# Patient Record
Sex: MALE | URBAN - METROPOLITAN AREA
[De-identification: names, ages, dates, MRNs, and addresses within clinical notes are randomized per-mention and may not be internally consistent; named-entity substitution may affect disease eponyms.]

---

## 2022-05-12 ENCOUNTER — OFFICE VISIT (OUTPATIENT)
Dept: URBAN - METROPOLITAN AREA CLINIC 6 | Facility: CLINIC | Age: 42
End: 2022-05-12
Payer: COMMERCIAL

## 2022-05-12 DIAGNOSIS — H11.051 PERIPHERAL PTERYGIUM, PROGRESSIVE, RIGHT EYE: Primary | ICD-10-CM

## 2022-05-12 DIAGNOSIS — H04.123 TEAR FILM INSUFFICIENCY OF BILATERAL LACRIMAL GLANDS: ICD-10-CM

## 2022-05-12 PROCEDURE — 99204 OFFICE O/P NEW MOD 45 MIN: CPT | Performed by: OPTOMETRIST

## 2022-05-12 ASSESSMENT — INTRAOCULAR PRESSURE
OS: 23
OD: 20

## 2022-05-12 NOTE — IMPRESSION/PLAN
Impression: Tear film insufficiency of bilateral lacrimal glands: H04.123. Plan: Dry eyes - SPK centrally appears to be causing variable blurry vision. Recommend use of high quality artificial tears 3-4x per day prn.

## 2022-05-12 NOTE — IMPRESSION/PLAN
Impression: Peripheral pterygium, progressive, right eye: H11.051. Plan: Pterygium right eye, causing astigmatism/refractive error/  and vision changes: patient reports significant irritation refractory to medical treatment and that it is growing in size. The risks, benefits, and alternatives of ptyergium surgery discussed with patient. Will plan to perform pterygium excision with possible placement of mitomycin-C and conjunctival autograft and/or amniotic membrane graft with tissue glue (off-label) and/or sutures. All questions answered. Patient stated a desire to proceed with the surgery.